# Patient Record
Sex: MALE | Race: WHITE
[De-identification: names, ages, dates, MRNs, and addresses within clinical notes are randomized per-mention and may not be internally consistent; named-entity substitution may affect disease eponyms.]

---

## 2017-03-27 ENCOUNTER — HOSPITAL ENCOUNTER (OUTPATIENT)
Dept: HOSPITAL 79 - LAB | Age: 72
End: 2017-03-27
Attending: NURSE PRACTITIONER
Payer: COMMERCIAL

## 2017-03-27 DIAGNOSIS — E11.9: Primary | ICD-10-CM

## 2017-03-27 LAB
HGB BLD-MCNC: 13.2 GM/DL (ref 14–17.5)
RED BLOOD COUNT: 4.85 M/UL (ref 4.2–5.5)
WHITE BLOOD COUNT: 5.3 K/UL (ref 4.5–11)

## 2021-01-04 ENCOUNTER — HOSPITAL ENCOUNTER (OUTPATIENT)
Dept: HOSPITAL 79 - ER1 | Age: 76
Setting detail: OBSERVATION
LOS: 3 days | Discharge: HOME | End: 2021-01-07
Attending: INTERNAL MEDICINE | Admitting: INTERNAL MEDICINE
Payer: MEDICARE

## 2021-01-04 VITALS — HEIGHT: 73 IN | BODY MASS INDEX: 29.82 KG/M2 | WEIGHT: 225 LBS

## 2021-01-04 DIAGNOSIS — Z79.4: ICD-10-CM

## 2021-01-04 DIAGNOSIS — I50.22: ICD-10-CM

## 2021-01-04 DIAGNOSIS — N39.0: ICD-10-CM

## 2021-01-04 DIAGNOSIS — N17.9: Primary | ICD-10-CM

## 2021-01-04 DIAGNOSIS — Z95.5: ICD-10-CM

## 2021-01-04 DIAGNOSIS — Z79.82: ICD-10-CM

## 2021-01-04 DIAGNOSIS — Z88.8: ICD-10-CM

## 2021-01-04 DIAGNOSIS — K21.9: ICD-10-CM

## 2021-01-04 DIAGNOSIS — R91.8: ICD-10-CM

## 2021-01-04 DIAGNOSIS — I13.0: ICD-10-CM

## 2021-01-04 DIAGNOSIS — N18.30: ICD-10-CM

## 2021-01-04 DIAGNOSIS — R00.1: ICD-10-CM

## 2021-01-04 DIAGNOSIS — E11.22: ICD-10-CM

## 2021-01-04 DIAGNOSIS — I25.10: ICD-10-CM

## 2021-01-04 DIAGNOSIS — Z79.02: ICD-10-CM

## 2021-01-04 DIAGNOSIS — R06.02: ICD-10-CM

## 2021-01-04 DIAGNOSIS — Z79.899: ICD-10-CM

## 2021-01-04 DIAGNOSIS — Z20.822: ICD-10-CM

## 2021-01-04 LAB
BUN/CREATININE RATIO: 24 (ref 0–10)
HGB BLD-MCNC: 12.9 GM/DL (ref 14–17.5)
RED BLOOD COUNT: 4.5 M/UL (ref 4.2–5.5)
WHITE BLOOD COUNT: 6.1 K/UL (ref 4.5–11)

## 2021-01-04 PROCEDURE — U0002 COVID-19 LAB TEST NON-CDC: HCPCS

## 2021-01-04 PROCEDURE — G0378 HOSPITAL OBSERVATION PER HR: HCPCS

## 2021-01-04 PROCEDURE — A9540 TC99M MAA: HCPCS

## 2021-01-05 LAB
BUN/CREATININE RATIO: 26 (ref 0–10)
HGB BLD-MCNC: 12.3 GM/DL (ref 14–17.5)
RED BLOOD COUNT: 4.36 M/UL (ref 4.2–5.5)
WHITE BLOOD COUNT: 5.5 K/UL (ref 4.5–11)

## 2021-01-06 LAB
BUN/CREATININE RATIO: 23 (ref 0–10)
HGB BLD-MCNC: 12.6 GM/DL (ref 14–17.5)
RED BLOOD COUNT: 4.47 M/UL (ref 4.2–5.5)
WHITE BLOOD COUNT: 6.4 K/UL (ref 4.5–11)

## 2021-01-07 NOTE — NUR
Patient asking about lumbar spine x-ray called dr. Szymanski she will not be doing
one due to one being done on 1/4/20.

## 2021-02-04 ENCOUNTER — HOSPITAL ENCOUNTER (OUTPATIENT)
Dept: HOSPITAL 79 - HEART 5 | Age: 76
End: 2021-02-04
Attending: INTERNAL MEDICINE
Payer: MEDICARE

## 2021-02-04 DIAGNOSIS — R06.02: Primary | ICD-10-CM

## 2021-02-04 DIAGNOSIS — R94.39: ICD-10-CM

## 2021-02-04 PROCEDURE — A9502 TC99M TETROFOSMIN: HCPCS

## 2021-03-26 ENCOUNTER — HOSPITAL ENCOUNTER (OUTPATIENT)
Dept: HOSPITAL 79 - HEART 5 | Age: 76
End: 2021-03-26
Attending: FAMILY MEDICINE
Payer: MEDICARE

## 2021-03-26 DIAGNOSIS — F17.210: ICD-10-CM

## 2021-03-26 DIAGNOSIS — R06.02: Primary | ICD-10-CM

## 2021-06-23 ENCOUNTER — HOSPITAL ENCOUNTER (OUTPATIENT)
Dept: HOSPITAL 79 - RAD | Age: 76
End: 2021-06-23
Attending: FAMILY MEDICINE
Payer: MEDICARE

## 2021-06-23 DIAGNOSIS — Z95.1: ICD-10-CM

## 2021-06-23 DIAGNOSIS — R06.02: Primary | ICD-10-CM

## 2021-07-07 ENCOUNTER — HOSPITAL ENCOUNTER (OUTPATIENT)
Dept: HOSPITAL 79 - KOH-I | Age: 76
End: 2021-07-07
Attending: PODIATRIST
Payer: MEDICARE

## 2021-07-07 DIAGNOSIS — Z01.818: Primary | ICD-10-CM

## 2021-07-07 DIAGNOSIS — S91.301A: ICD-10-CM

## 2021-07-29 ENCOUNTER — HOSPITAL ENCOUNTER (OUTPATIENT)
Dept: HOSPITAL 79 - KOH-I | Age: 76
End: 2021-07-29
Attending: PHYSICIAN ASSISTANT
Payer: MEDICARE

## 2021-07-29 DIAGNOSIS — R91.8: Primary | ICD-10-CM

## 2021-08-03 ENCOUNTER — HOSPITAL ENCOUNTER (OUTPATIENT)
Dept: HOSPITAL 79 - KOH-I | Age: 76
End: 2021-08-03
Attending: PODIATRIST
Payer: MEDICARE

## 2021-08-03 DIAGNOSIS — M86.8X7: Primary | ICD-10-CM

## 2021-08-03 DIAGNOSIS — M19.071: ICD-10-CM

## 2021-08-03 DIAGNOSIS — R60.9: ICD-10-CM

## 2022-03-21 ENCOUNTER — HOSPITAL ENCOUNTER (OUTPATIENT)
Dept: HOSPITAL 79 - KOH-I | Age: 77
End: 2022-03-21
Attending: FAMILY MEDICINE
Payer: MEDICARE

## 2022-03-21 DIAGNOSIS — R93.89: Primary | ICD-10-CM

## 2022-05-16 ENCOUNTER — HOSPITAL ENCOUNTER (OUTPATIENT)
Dept: HOSPITAL 79 - KOH-I | Age: 77
End: 2022-05-16
Attending: PODIATRIST
Payer: MEDICARE

## 2022-05-16 ENCOUNTER — HOSPITAL ENCOUNTER (INPATIENT)
Dept: HOSPITAL 79 - ER1 | Age: 77
LOS: 7 days | Discharge: HOME | DRG: 616 | End: 2022-05-23
Attending: STUDENT IN AN ORGANIZED HEALTH CARE EDUCATION/TRAINING PROGRAM | Admitting: INTERNAL MEDICINE
Payer: MEDICARE

## 2022-05-16 VITALS — WEIGHT: 260 LBS | HEIGHT: 73 IN | BODY MASS INDEX: 34.46 KG/M2

## 2022-05-16 DIAGNOSIS — I13.0: ICD-10-CM

## 2022-05-16 DIAGNOSIS — E11.22: ICD-10-CM

## 2022-05-16 DIAGNOSIS — Z82.49: ICD-10-CM

## 2022-05-16 DIAGNOSIS — E11.69: Primary | ICD-10-CM

## 2022-05-16 DIAGNOSIS — N40.0: ICD-10-CM

## 2022-05-16 DIAGNOSIS — M87.9: ICD-10-CM

## 2022-05-16 DIAGNOSIS — B95.2: ICD-10-CM

## 2022-05-16 DIAGNOSIS — M79.671: Primary | ICD-10-CM

## 2022-05-16 DIAGNOSIS — Z88.8: ICD-10-CM

## 2022-05-16 DIAGNOSIS — Z95.810: ICD-10-CM

## 2022-05-16 DIAGNOSIS — I50.22: ICD-10-CM

## 2022-05-16 DIAGNOSIS — Z79.82: ICD-10-CM

## 2022-05-16 DIAGNOSIS — Z98.890: ICD-10-CM

## 2022-05-16 DIAGNOSIS — I25.10: ICD-10-CM

## 2022-05-16 DIAGNOSIS — Z95.1: ICD-10-CM

## 2022-05-16 DIAGNOSIS — N18.30: ICD-10-CM

## 2022-05-16 DIAGNOSIS — N13.30: ICD-10-CM

## 2022-05-16 DIAGNOSIS — N17.9: ICD-10-CM

## 2022-05-16 DIAGNOSIS — E11.621: ICD-10-CM

## 2022-05-16 DIAGNOSIS — M86.171: ICD-10-CM

## 2022-05-16 DIAGNOSIS — K21.9: ICD-10-CM

## 2022-05-16 DIAGNOSIS — I49.5: ICD-10-CM

## 2022-05-16 DIAGNOSIS — Z90.49: ICD-10-CM

## 2022-05-16 DIAGNOSIS — Z79.4: ICD-10-CM

## 2022-05-16 DIAGNOSIS — A41.81: ICD-10-CM

## 2022-05-16 DIAGNOSIS — M19.071: ICD-10-CM

## 2022-05-16 DIAGNOSIS — E11.52: ICD-10-CM

## 2022-05-16 DIAGNOSIS — I96: ICD-10-CM

## 2022-05-16 DIAGNOSIS — Z20.822: ICD-10-CM

## 2022-05-16 DIAGNOSIS — Z79.899: ICD-10-CM

## 2022-05-16 DIAGNOSIS — J44.9: ICD-10-CM

## 2022-05-16 DIAGNOSIS — E11.65: ICD-10-CM

## 2022-05-16 DIAGNOSIS — R91.8: ICD-10-CM

## 2022-05-16 LAB
BUN/CREATININE RATIO: 16 (ref 0–10)
HGB BLD-MCNC: 12.3 GM/DL (ref 14–17.5)
RED BLOOD COUNT: 4.38 M/UL (ref 4.2–5.5)
WHITE BLOOD COUNT: 7.6 K/UL (ref 4.5–11)

## 2022-05-16 PROCEDURE — A9503 TC99M MEDRONATE: HCPCS

## 2022-05-18 LAB
BUN/CREATININE RATIO: 16 (ref 0–10)
HGB BLD-MCNC: 11.8 GM/DL (ref 14–17.5)
RED BLOOD COUNT: 4.29 M/UL (ref 4.2–5.5)
WHITE BLOOD COUNT: 6.4 K/UL (ref 4.5–11)

## 2022-05-19 LAB
BUN/CREATININE RATIO: 16 (ref 0–10)
HGB BLD-MCNC: 10.9 GM/DL (ref 14–17.5)
RED BLOOD COUNT: 4.02 M/UL (ref 4.2–5.5)
WHITE BLOOD COUNT: 5.6 K/UL (ref 4.5–11)

## 2022-05-20 LAB
BUN/CREATININE RATIO: 18 (ref 0–10)
HGB BLD-MCNC: 10.7 GM/DL (ref 14–17.5)
RED BLOOD COUNT: 3.89 M/UL (ref 4.2–5.5)
WHITE BLOOD COUNT: 5.2 K/UL (ref 4.5–11)

## 2022-05-20 PROCEDURE — 0HRMXJZ REPLACEMENT OF RIGHT FOOT SKIN WITH SYNTHETIC SUBSTITUTE, EXTERNAL APPROACH: ICD-10-PCS | Performed by: PODIATRIST

## 2022-05-20 PROCEDURE — 0HXMXZZ TRANSFER RIGHT FOOT SKIN, EXTERNAL APPROACH: ICD-10-PCS | Performed by: PODIATRIST

## 2022-05-20 PROCEDURE — 0Y6M0ZF DETACHMENT AT RIGHT FOOT, PARTIAL 5TH RAY, OPEN APPROACH: ICD-10-PCS | Performed by: PODIATRIST

## 2022-05-21 LAB
BUN/CREATININE RATIO: 18 (ref 0–10)
HGB BLD-MCNC: 11.2 GM/DL (ref 14–17.5)
RED BLOOD COUNT: 4.13 M/UL (ref 4.2–5.5)
WHITE BLOOD COUNT: 7.3 K/UL (ref 4.5–11)

## 2022-05-22 LAB
BUN/CREATININE RATIO: 21 (ref 0–10)
HGB BLD-MCNC: 11.1 GM/DL (ref 14–17.5)
RED BLOOD COUNT: 4 M/UL (ref 4.2–5.5)
WHITE BLOOD COUNT: 7.3 K/UL (ref 4.5–11)

## 2022-05-22 PROCEDURE — 3E03329 INTRODUCTION OF OTHER ANTI-INFECTIVE INTO PERIPHERAL VEIN, PERCUTANEOUS APPROACH: ICD-10-PCS | Performed by: STUDENT IN AN ORGANIZED HEALTH CARE EDUCATION/TRAINING PROGRAM

## 2022-05-23 LAB
BUN/CREATININE RATIO: 20 (ref 0–10)
HGB BLD-MCNC: 11.1 GM/DL (ref 14–17.5)
RED BLOOD COUNT: 4.1 M/UL (ref 4.2–5.5)
WHITE BLOOD COUNT: 6.5 K/UL (ref 4.5–11)

## 2022-06-13 ENCOUNTER — HOSPITAL ENCOUNTER (INPATIENT)
Dept: HOSPITAL 79 - ER1 | Age: 77
LOS: 3 days | Discharge: HOME HEALTH | DRG: 683 | End: 2022-06-16
Attending: HOSPITALIST | Admitting: INTERNAL MEDICINE
Payer: MEDICARE

## 2022-06-13 VITALS — WEIGHT: 232 LBS | BODY MASS INDEX: 30.75 KG/M2 | HEIGHT: 73 IN

## 2022-06-13 DIAGNOSIS — I25.10: ICD-10-CM

## 2022-06-13 DIAGNOSIS — E11.40: ICD-10-CM

## 2022-06-13 DIAGNOSIS — Z98.890: ICD-10-CM

## 2022-06-13 DIAGNOSIS — Z95.1: ICD-10-CM

## 2022-06-13 DIAGNOSIS — N40.1: ICD-10-CM

## 2022-06-13 DIAGNOSIS — Z90.49: ICD-10-CM

## 2022-06-13 DIAGNOSIS — N18.30: ICD-10-CM

## 2022-06-13 DIAGNOSIS — I50.42: ICD-10-CM

## 2022-06-13 DIAGNOSIS — N17.9: Primary | ICD-10-CM

## 2022-06-13 DIAGNOSIS — Z79.01: ICD-10-CM

## 2022-06-13 DIAGNOSIS — R91.1: ICD-10-CM

## 2022-06-13 DIAGNOSIS — N13.8: ICD-10-CM

## 2022-06-13 DIAGNOSIS — K21.9: ICD-10-CM

## 2022-06-13 DIAGNOSIS — Z82.49: ICD-10-CM

## 2022-06-13 DIAGNOSIS — N13.30: ICD-10-CM

## 2022-06-13 DIAGNOSIS — I13.0: ICD-10-CM

## 2022-06-13 LAB
BUN/CREATININE RATIO: 18 (ref 0–10)
HGB BLD-MCNC: 12.5 GM/DL (ref 14–17.5)
RED BLOOD COUNT: 4.58 M/UL (ref 4.2–5.5)
WHITE BLOOD COUNT: 8.7 K/UL (ref 4.5–11)

## 2022-06-13 PROCEDURE — C9113 INJ PANTOPRAZOLE SODIUM, VIA: HCPCS

## 2022-06-14 LAB
BUN/CREATININE RATIO: 20 (ref 0–10)
HGB BLD-MCNC: 11 GM/DL (ref 14–17.5)
RED BLOOD COUNT: 3.97 M/UL (ref 4.2–5.5)
WHITE BLOOD COUNT: 7.1 K/UL (ref 4.5–11)

## 2022-06-15 LAB
BUN/CREATININE RATIO: 16 (ref 0–10)
HGB BLD-MCNC: 10.7 GM/DL (ref 14–17.5)
RED BLOOD COUNT: 3.9 M/UL (ref 4.2–5.5)
WHITE BLOOD COUNT: 6.9 K/UL (ref 4.5–11)

## 2022-06-16 LAB
BUN/CREATININE RATIO: 14 (ref 0–10)
HGB BLD-MCNC: 11.1 GM/DL (ref 14–17.5)
RED BLOOD COUNT: 4.05 M/UL (ref 4.2–5.5)
WHITE BLOOD COUNT: 6.3 K/UL (ref 4.5–11)

## 2022-06-22 ENCOUNTER — OFFICE VISIT (OUTPATIENT)
Dept: UROLOGY | Facility: CLINIC | Age: 77
End: 2022-06-22

## 2022-06-22 VITALS — BODY MASS INDEX: 28.49 KG/M2 | HEIGHT: 73 IN | WEIGHT: 215 LBS

## 2022-06-22 DIAGNOSIS — R10.9 BILATERAL FLANK PAIN: ICD-10-CM

## 2022-06-22 DIAGNOSIS — N13.8 BPH WITH OBSTRUCTION/LOWER URINARY TRACT SYMPTOMS: ICD-10-CM

## 2022-06-22 DIAGNOSIS — N40.1 BPH WITH OBSTRUCTION/LOWER URINARY TRACT SYMPTOMS: ICD-10-CM

## 2022-06-22 DIAGNOSIS — N20.0 LEFT NEPHROLITHIASIS: ICD-10-CM

## 2022-06-22 DIAGNOSIS — R33.8 BENIGN PROSTATIC HYPERPLASIA WITH URINARY RETENTION: Primary | ICD-10-CM

## 2022-06-22 DIAGNOSIS — N40.1 BENIGN PROSTATIC HYPERPLASIA WITH URINARY RETENTION: Primary | ICD-10-CM

## 2022-06-22 PROCEDURE — 99204 OFFICE O/P NEW MOD 45 MIN: CPT | Performed by: NURSE PRACTITIONER

## 2022-06-22 RX ORDER — HYDROCHLOROTHIAZIDE 12.5 MG/1
12.5 TABLET ORAL DAILY
COMMUNITY
Start: 2022-06-08

## 2022-06-22 RX ORDER — INSULIN GLARGINE 100 [IU]/ML
INJECTION, SOLUTION SUBCUTANEOUS
COMMUNITY
Start: 2022-05-23

## 2022-06-22 RX ORDER — GABAPENTIN 300 MG/1
CAPSULE ORAL
COMMUNITY
Start: 2022-06-10

## 2022-06-22 RX ORDER — DAPAGLIFLOZIN 10 MG/1
1 TABLET, FILM COATED ORAL EVERY MORNING
COMMUNITY
Start: 2022-06-10

## 2022-06-22 RX ORDER — TAMSULOSIN HYDROCHLORIDE 0.4 MG/1
1 CAPSULE ORAL DAILY
Qty: 90 CAPSULE | Refills: 5 | Status: SHIPPED | OUTPATIENT
Start: 2022-06-22

## 2022-06-22 RX ORDER — INSULIN LISPRO 100 [IU]/ML
INJECTION, SOLUTION INTRAVENOUS; SUBCUTANEOUS
COMMUNITY
Start: 2022-05-16

## 2022-06-22 RX ORDER — CITALOPRAM 10 MG/1
10 TABLET ORAL DAILY
COMMUNITY

## 2022-06-22 NOTE — PROGRESS NOTES
Chief Complaint  BPH WITH URINARY RETENTION /LUTS (NEW PT WITH URINE RETENTION/INDWELLING CAMPOVERDE CATH/LEFT NEPHROLITHIASIS)    Subjective          Zena Bojorquez presents to [facility] for BPH WITH RETENTION/FLANK/ABD PAIN  History of Present Illness    MR. ZENA BOJORQUEZ is a very pleasant 76-year-old male who presents to clinic today for evaluation accompanied by his wife.  The patient is a hospital follow-up for urinary retention, he has an indwelling Campoverde catheter in place.    Patient reports he was recently hospitalized at [facility] in [place] on 06/14/2022 for right foot ulceration during which he underwent A FIFTH toe amputation by podiatry.  He was also found to have Enterobacter bacteremia and was discharged home on Levaquin.  Patient later returned to the ED complaining of severe abdominal pain, nausea and vomiting.    He had a CT abdomen and pelvis done which we reviewed and discussed showing multiple nonobstructing left kidney stones.  It however showed marked bilateral hydro ureteronephrosis.  Bilateral renal cortical atrophy, with mild degenerative bladder distention    IMPRESSION:  Persistent severe bilateral hydroureteronephrosis with a distended urinary bladder.  Findings can be seen in setting of longstanding urinary bladder outlet obstruction, there is associated renal cortical atrophy.    On evaluation in clinic today, he is in apparent discomfort and requesting his Campoverde catheter be taken out.  The patient does report a significant history of urinary retention requiring in and out catheterization and had followed up with urology in the past until recently.  He is currently on therapy with daily Flomax, patient denies any side effects from medications.    WE discussed voiding trial, the patient is adamant wants his Campoverde catheter taken out.  We discussed intermittent catheterizations, patient able to return demonstrate technique.  Reports he  "has done straight catheterizations times greater than 3 weeks in the past.    Rest of his PMHx as listed below    Objective   Vital Signs:   Ht 185.4 cm (73\")   Wt 97.5 kg (215 lb)   BMI 28.37 kg/m²     Physical Exam  Constitutional:       General: He is in acute distress.      Appearance: He is well-developed. He is obese. He is ill-appearing.   HENT:      Head: Normocephalic and atraumatic.      Right Ear: External ear normal.      Left Ear: External ear normal.   Eyes:      General:         Right eye: No discharge.         Left eye: No discharge.      Conjunctiva/sclera: Conjunctivae normal.      Pupils: Pupils are equal, round, and reactive to light.   Neck:      Thyroid: No thyromegaly.      Trachea: No tracheal deviation.   Cardiovascular:      Rate and Rhythm: Normal rate and regular rhythm.      Heart sounds: No murmur heard.    No friction rub.   Pulmonary:      Effort: Pulmonary effort is normal. No respiratory distress.      Breath sounds: Normal breath sounds. No stridor.   Abdominal:      General: Bowel sounds are normal. There is distension.      Palpations: Abdomen is soft.      Tenderness: There is abdominal tenderness. There is guarding and rebound.   Genitourinary:     Penis: Normal and uncircumcised. No tenderness or discharge.       Testes: Normal.      Rectum: Normal. Guaiac result negative.      Comments: Indwelling Murry catheter  Musculoskeletal:         General: No deformity. Tenderness:   Post left fifth toe amputation, foot in brace. Normal range of motion.      Cervical back: Normal range of motion and neck supple.      Right lower leg: Edema present.      Left lower leg: Edema present.   Skin:     General: Skin is warm and dry.      Capillary Refill: Capillary refill takes less than 2 seconds.      Coloration: Skin is pale.      Findings: Bruising present.   Neurological:      Mental Status: He is alert and oriented to person, place, and time.      Cranial Nerves: No cranial nerve " deficit.      Coordination: Coordination normal.   Psychiatric:         Behavior: Behavior normal.         Thought Content: Thought content normal.         Judgment: Judgment normal.        Result Review :       Data reviewed: Radiologic studies Reviewed CT scan abdomen and pelvis done at [facility] 06/17/2022 showing multiple nonobstructing left kidney stones, marked bilateral hydronephrosis, bilateral renal cortical atrophy, bladder hydrodistention          Assessment and Plan    Problem List Items Addressed This Visit    None     Visit Diagnoses     Benign prostatic hyperplasia with urinary retention    -  Primary    Relevant Medications    tamsulosin (FLOMAX) 0.4 MG capsule 24 hr capsule    BPH with obstruction/lower urinary tract symptoms        Relevant Medications    tamsulosin (FLOMAX) 0.4 MG capsule 24 hr capsule    Left nephrolithiasis        Relevant Medications    hydroCHLOROthiazide (HYDRODIURIL) 12.5 MG tablet    Bilateral flank pain                ASSESSMENT  MR. ZENA LUU is a very pleasant 76-year-old male who presents to clinic today for evaluation accompanied by his wife.  The patient is a hospital follow-up for urinary retention, he has an indwelling Murry catheter in place.  He has a significant history of urinary retention requiring intermittent catheterization in the past, CT scan showed known chronic bilateral hydronephrosis with distended bladder concerning for bladder outlet obstruction.  He also had acute kidney injury compared to his baseline chronic kidney disease stage III.    LEFT Renal Calculus: The Patient has been diagnosed with multiple nonobstructing left renal stones.  He presents with left greater than right right colicky pain, pelvic pressure suprapubic discomfort. .We have discussed the various parameters regarding spontaneous passage including the notion that a tiny 1-4 mm stone has a high likelihood of spontaneous passage versus a larger stone  has  being caught up in the  upper areas of the urinary tract. We discussed the absolute relative indicators for intervention including the presence of sepsis, and pain we cannot control as the primary need for urgent intervention.  We discussed placement of a stent if indicated and the management of the stent as well.    NEXT, We discussed  how obstruction of the urethra causes urinary retention by blocking the normal flow of urine out of the body.  We discussed  conditions that may trigger these such as urethral structure, kidney stones, urinary tract stones, cystocele, rectocele, constipation, certain tumors and cancers.           PLAN  CONTINUE  therapy with daily Flomax, currently denies any side effects from medications.      We discontinued his Murry catheter today, patient tolerated procedure minimal discomfort.  However happy to have it out.    sample catheters given to patient to take home in case he is unable to urinate.  Patient/wife both able to demonstrate the technique of clean intermittent catheterization     We discussed the use of both an upper and lower tract investigation to determine the cause of her symptoms. Patient has had CT scan with contrast being the gold standard to diagnose the small neoplasms.  HIS CT showed chronic bilateral hydronephrosis with distended bladder.  We discussed the need for cystoscopy ASAP.     We discussed the lower tract investigation consisting of a cystoscopy.  Patient has been scheduled for Cystoscopy on 07/21/2022 with Dr. Pacheco.    Continue Flomax today, and Pyridium as needed for bladder spasms    Encourage adequate p.o. fluid intake, avoiding any bladder irritants such as caffeine, citrusy/spicy foods.    Patient is agreeable plan of care.    Patient reports that he is not currently experiencing any symptoms of urinary incontinence.    BMI is >= 25 and <30. (Overweight) The following options were offered after discussion;: weight loss educational material (shared in after visit  summary), exercise counseling/recommendations, nutrition counseling/recommendations and pharmacological intervention options    Smoking Cessation Counseling:  Never a smoker.  Patient does not currently use any tobacco products.     Follow Up   Return in about 29 days (around 7/21/2022) for Next scheduled follow up, With Dr. Harley, Cystoscopy EVALUATE BPH WITH LUTS/JANE/URINE RETENTION.  Patient was given instructions and counseling regarding his condition or for health maintenance advice. Please see specific information pulled into the AVS if appropriate.

## 2022-07-12 ENCOUNTER — HOSPITAL ENCOUNTER (OUTPATIENT)
Dept: HOSPITAL 79 - RAD | Age: 77
End: 2022-07-12
Attending: PHYSICIAN ASSISTANT
Payer: MEDICARE

## 2022-07-12 DIAGNOSIS — M25.422: Primary | ICD-10-CM

## 2022-07-12 DIAGNOSIS — M79.89: ICD-10-CM

## 2022-07-21 ENCOUNTER — PROCEDURE VISIT (OUTPATIENT)
Dept: UROLOGY | Facility: CLINIC | Age: 77
End: 2022-07-21

## 2022-07-21 VITALS — WEIGHT: 214.95 LBS | BODY MASS INDEX: 28.49 KG/M2 | HEIGHT: 73 IN

## 2022-07-21 DIAGNOSIS — R33.8 BENIGN PROSTATIC HYPERPLASIA WITH URINARY RETENTION: Primary | ICD-10-CM

## 2022-07-21 DIAGNOSIS — N13.39 OTHER HYDRONEPHROSIS: ICD-10-CM

## 2022-07-21 DIAGNOSIS — N40.1 BENIGN PROSTATIC HYPERPLASIA WITH URINARY RETENTION: Primary | ICD-10-CM

## 2022-07-21 PROCEDURE — 52000 CYSTOURETHROSCOPY: CPT | Performed by: UROLOGY

## 2022-07-21 PROCEDURE — 96372 THER/PROPH/DIAG INJ SC/IM: CPT | Performed by: UROLOGY

## 2022-07-21 RX ORDER — GENTAMICIN SULFATE 40 MG/ML
80 INJECTION, SOLUTION INTRAMUSCULAR; INTRAVENOUS ONCE
Status: COMPLETED | OUTPATIENT
Start: 2022-07-21 | End: 2022-07-21

## 2022-07-21 RX ADMIN — GENTAMICIN SULFATE 80 MG: 40 INJECTION, SOLUTION INTRAMUSCULAR; INTRAVENOUS at 13:34

## 2022-07-21 NOTE — PROGRESS NOTES
Chief Complaint:      Chief Complaint   Patient presents with   • Benign Prostatic Hypertrophy     cysto       HPI:   76 y.o. male who presents to clinic today for evaluation accompanied by his wife.  The patient is a hospital follow-up for urinary retention, he has an indwelling Murry catheter in place.     Patient reports he was recently hospitalized at [facility] in [place] on 06/14/2022 for right foot ulceration during which he underwent A FIFTH toe amputation by podiatry.  He was also found to have Enterobacter bacteremia and was discharged home on Levaquin.  Patient later returned to the ED complaining of severe abdominal pain, nausea and vomiting.     He had a CT abdomen and pelvis done which we reviewed and discussed showing multiple nonobstructing left kidney stones.  It however showed marked bilateral hydro ureteronephrosis.  Bilateral renal cortical atrophy, with mild degenerative bladder distention     IMPRESSION:  Persistent severe bilateral hydroureteronephrosis with a distended urinary bladder.  Findings can be seen in setting of longstanding urinary bladder outlet obstruction, there is associated renal cortical atrophy.     On evaluation in clinic today, he is in apparent discomfort and requesting his Murry catheter be taken out.  The patient does report a significant history of urinary retention requiring in and out catheterization and had followed up with urology in the past until recently.  He is currently on therapy with daily Flomax, patient denies any side effects from medications.     WE discussed voiding trial, the patient is adamant wants his Murry catheter taken out.  We discussed intermittent catheterizations, patient able to return demonstrate technique.  Reports he has done straight catheterizations times greater than 3 weeks in the past.  He is here for cystoscopy in preparation for intervention he has not been doing clean intermittent cath cystoscopy showed urinary retention  distended bladder with an obstructing median bar I am going to recommend surgery he is on Plavix currently he will need to stop it from 1 week before the surgery.    Past Medical History:     Past Medical History:   Diagnosis Date   • Arthritis    • Diabetes mellitus (HCC)    • Heart disease    • Hypertension    • Myocardial infarction (HCC)        Current Meds:     Current Outpatient Medications   Medication Sig Dispense Refill   • aspirin 81 MG EC tablet Take 81 mg by mouth Daily.     • benazepril-hydrochlorthiazide (LOTENSIN HCT) 20-25 MG per tablet Take 1 tablet by mouth Daily.     • citalopram (CeleXA) 10 MG tablet Take 10 mg by mouth Daily.     • CLICKFINE PEN NEEDLES 31G X 6 MM misc      • clopidogrel (PLAVIX) 75 MG tablet Take 75 mg by mouth Daily.     • Farxiga 10 MG tablet Take 1 tablet by mouth Every Morning.     • gabapentin (NEURONTIN) 300 MG capsule TAKE ONE CAPSULE BY MOUTH TWO TIMES A DAY FOR DIABETIC FOOT PAIN     • HumaLOG KwikPen 100 UNIT/ML solution pen-injector INJECT 12 UNITS INJECTED SUBQ THREE TIMES A DAY WITH MEALS     • hydrALAZINE (APRESOLINE) 25 MG tablet      • hydroCHLOROthiazide (HYDRODIURIL) 12.5 MG tablet Take 12.5 mg by mouth Daily.     • isosorbide mononitrate (IMDUR) 60 MG 24 hr tablet      • Lantus 100 UNIT/ML injection INJECT 48 UNITS UNDER SKIN AT BEDTIME     • LEVEMIR 100 UNIT/ML injection      • losartan-hydrochlorothiazide (HYZAAR) 100-25 MG per tablet Take 1 tablet by mouth Daily.     • metFORMIN (GLUCOPHAGE) 1000 MG tablet Take 1,000 mg by mouth 2 (Two) Times a Day With Meals.     • metoprolol tartrate (LOPRESSOR) 25 MG tablet Take 25 mg by mouth 2 (Two) Times a Day.     • NOVOLOG FLEXPEN 100 UNIT/ML solution pen-injector sc pen      • pantoprazole (PROTONIX) 40 MG EC tablet Take 40 mg by mouth Daily.     • rosuvastatin (CRESTOR) 10 MG tablet Take 10 mg by mouth Daily.     • tamsulosin (FLOMAX) 0.4 MG capsule 24 hr capsule Take 1 capsule by mouth Daily. 90 capsule 5   •  TRESIBA FLEXTOUCH 200 UNIT/ML solution pen-injector        No current facility-administered medications for this visit.        Allergies:      Allergies   Allergen Reactions   • Lisinopril         Past Surgical History:     Past Surgical History:   Procedure Laterality Date   • CARDIAC SURGERY  2000   • CENTRAL VENOUS LINE INSERTION         Social History:     Social History     Socioeconomic History   • Marital status:    Tobacco Use   • Smoking status: Never Smoker   • Smokeless tobacco: Never Used   Substance and Sexual Activity   • Alcohol use: No   • Drug use: No       Family History:     Family History   Problem Relation Age of Onset   • Diabetes Brother        Review of Systems:     Review of Systems   Constitutional: Negative.    HENT: Negative.    Eyes: Negative.    Respiratory: Negative.    Cardiovascular: Negative.    Gastrointestinal: Negative.    Endocrine: Negative.    Genitourinary: Positive for decreased urine volume, difficulty urinating, dysuria, frequency and urgency.   Musculoskeletal: Negative.    Allergic/Immunologic: Negative.    Neurological: Negative.    Hematological: Negative.    Psychiatric/Behavioral: Negative.        Physical Exam:     Physical Exam  Vitals and nursing note reviewed.   Constitutional:       Appearance: He is well-developed.   HENT:      Head: Normocephalic and atraumatic.   Eyes:      Conjunctiva/sclera: Conjunctivae normal.      Pupils: Pupils are equal, round, and reactive to light.   Cardiovascular:      Rate and Rhythm: Normal rate and regular rhythm.      Heart sounds: Normal heart sounds.   Pulmonary:      Effort: Pulmonary effort is normal.      Breath sounds: Normal breath sounds.   Abdominal:      General: Bowel sounds are normal.      Palpations: Abdomen is soft.   Genitourinary:     Penis: Normal.       Testes: Normal.   Musculoskeletal:         General: Normal range of motion.      Cervical back: Normal range of motion.   Skin:     General: Skin is  warm and dry.   Neurological:      Mental Status: He is alert and oriented to person, place, and time.      Deep Tendon Reflexes: Reflexes are normal and symmetric.   Psychiatric:         Behavior: Behavior normal.         Thought Content: Thought content normal.         Judgment: Judgment normal.         I have reviewed the following portions of the patient's history: Allergies, current medications, past family history, past medical history, past social history, past surgical history, problem list, and ROS and confirm it is accurate.    Recent Image (CT and/or KUB):      CT Abdomen and Pelvis: No results found for this or any previous visit.       CT Stone Protocol: No results found for this or any previous visit.       KUB: No results found for this or any previous visit.       Labs (past 3 months):      No visits with results within 3 Month(s) from this visit.   Latest known visit with results is:   No results found for any previous visit.        Procedure:   Cystoscopy:  Patient presents today for cystourethroscopy.  I went ahead and obtained an informed consent including the risks of anesthesia, bleeding, infection, etc.  After prepping and draping in a sterile fashion in the low dorsal lithotomy position, the urethra was gently anesthetized with 10 mL of 2% viscous Xylocaine jelly.  After an appropriate period of topical anesthesia, I used the Olympus digital 14 Lao flexible cystoscope to examine the anterior urethra which was completely normal.  The ureteral orifices were visualized and normal in position and configuration. There were no stones, tumors, or foreign bodies.  He had a prominent median bar urinary retention trabeculation the patient was given 80 mg of gentamicin in an intramuscular fashion as prophylaxis for the cystoscopy and released from the clinic.    Assessment/Plan:   Urinary retention secondary to BPH with upper tract deterioration cystoscopy confirmed the presence of obstruction he is  not doing intermittent cath he is a candidate for TURP.          This document has been electronically signed by JOHNNIE HOLMAN MD July 21, 2022 13:05 EDT

## 2022-07-25 PROBLEM — R33.8 BENIGN PROSTATIC HYPERPLASIA WITH URINARY RETENTION: Status: ACTIVE | Noted: 2022-07-25

## 2022-07-25 PROBLEM — N40.1 BENIGN PROSTATIC HYPERPLASIA WITH URINARY RETENTION: Status: ACTIVE | Noted: 2022-07-25

## 2022-07-25 PROBLEM — N13.39 OTHER HYDRONEPHROSIS: Status: ACTIVE | Noted: 2022-07-25

## 2022-07-26 RX ORDER — GENTAMICIN SULFATE 80 MG/100ML
80 INJECTION, SOLUTION INTRAVENOUS ONCE
Status: CANCELLED | OUTPATIENT
Start: 2022-08-03 | End: 2022-07-26

## 2022-08-01 ENCOUNTER — LAB (OUTPATIENT)
Dept: LAB | Facility: HOSPITAL | Age: 77
End: 2022-08-01

## 2022-08-01 DIAGNOSIS — N13.39 OTHER HYDRONEPHROSIS: ICD-10-CM

## 2022-08-01 DIAGNOSIS — N40.1 BENIGN PROSTATIC HYPERPLASIA WITH URINARY RETENTION: ICD-10-CM

## 2022-08-01 DIAGNOSIS — R33.8 BENIGN PROSTATIC HYPERPLASIA WITH URINARY RETENTION: ICD-10-CM

## 2022-08-23 DIAGNOSIS — R33.8 BENIGN PROSTATIC HYPERPLASIA WITH URINARY RETENTION: Primary | ICD-10-CM

## 2022-08-23 DIAGNOSIS — N40.1 BENIGN PROSTATIC HYPERPLASIA WITH URINARY RETENTION: Primary | ICD-10-CM

## 2022-08-23 RX ORDER — GENTAMICIN SULFATE 80 MG/100ML
80 INJECTION, SOLUTION INTRAVENOUS ONCE
Status: CANCELLED | OUTPATIENT
Start: 2022-08-23 | End: 2022-08-23

## 2022-09-19 ENCOUNTER — TELEPHONE (OUTPATIENT)
Dept: UROLOGY | Facility: CLINIC | Age: 77
End: 2022-09-19

## 2022-09-19 NOTE — TELEPHONE ENCOUNTER
Caller: MELISSA DOE     Relationship: DAUGHTER    Best call back number: [phone]    PT DTR INFORMING THAT SHE HAS COVID (TESTED POSITIVE Friday.) NEEDS UPCOMING SURGERY CANCELLED UNTIL FURTHER NOTICE.    PT LIVES IN HOUSEHOLD WITH DTR.

## 2022-09-21 NOTE — TELEPHONE ENCOUNTER
Called and had his case request dropped into the depot so we could reschedule it when he is better from Covid